# Patient Record
(demographics unavailable — no encounter records)

---

## 2024-12-06 NOTE — HISTORY OF PRESENT ILLNESS
[FreeTextEntry1] : Follow-up [de-identified] : Ms. Madrigal presents for a follow-up evaluation.  She is feeling well.  She denies any chest pain, shortness of breath or palpitations.  She also presents for review of comprehensive lab work.

## 2024-12-06 NOTE — PLAN
[FreeTextEntry1] : Ms. Madrigal presents for follow-up evaluation.  1.  Comprehensive lab work was reviewed with patient. 2.  Continue current medication regimen which has been reviewed and revised. 3.  Patient has already received the influenza vaccine. 4.  Follow-up in 6 months.